# Patient Record
Sex: MALE | Race: WHITE | URBAN - METROPOLITAN AREA
[De-identification: names, ages, dates, MRNs, and addresses within clinical notes are randomized per-mention and may not be internally consistent; named-entity substitution may affect disease eponyms.]

---

## 2022-11-04 ENCOUNTER — PROCEDURE VISIT (OUTPATIENT)
Dept: SPORTS MEDICINE | Age: 22
End: 2022-11-04

## 2022-11-04 DIAGNOSIS — M75.22 BICEPS TENDINITIS OF LEFT SHOULDER: Primary | ICD-10-CM

## 2022-11-04 NOTE — PROGRESS NOTES
Athletic Training  Date of Report: 2022  Name: Jean-Claude Streeter  School: [employer]  Sport: Swimming/Diving  : 2000  Age: 24 y.o. MRN: <[id number]>  Encounter:  [x] New AT Eval     [] Follow-Up Visit    [] Other:   SUBJECTIVE:  Reason for Visit:    Chief Complaint   Patient presents with    Shoulder Injury     Jean-Claude Streeter is a 24y.o. year old, male who presents today for evaluation of athletic injury involving left shoulder. 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 Jean-Claude Streeter report they are right hand dominate. Onset of the injury began gradually, starting about 2 week ago and injury occurred during practice. Current pain and symptoms include: sharp when swimming after a certain durration and achy/sore at rest following practice. Current level of pain is a 2 at rest and a 5/10 while swimming. Symptoms have been chronic since that time. Symptoms improve with ice. No noticeable pain relief with medication. Symptoms worsen with swimming. The shoulder has not dislocated or felt out of place. Shoulder has felt numb and/or lost sensation on one occasion a few days ago, but symptoms have resolved. Associated sounds or feelings at time of injury included: snap. Treatment to date has included: none. Treatment has been somewhat helpful. Previous history includes: None.    OBJECTIVE:   Physical Exam  Shoulder Positioning / Carry Position:    Shoulder Position: [x] Normal  [] Guarding   [] Hanging Limp  Assistive Device: [x] None  [] Brace  [] Sling  [] Other:   Inspection:   Skin:   [x] Intact [] Abrasion  [] Laceration  Notes:   Ecchymosis:  [x] None [] Mild  [] Moderate  [] Severe  Notes:   Atrophy:  [x] None [] Mild  [] Moderate  [] Severe  Notes:   Effusion:  [x] None [] Mild  [] Moderate  [] Severe  Notes:   Deformity:  [x] None [] Mild  [] Moderate  [] Severe  Notes:   Scar / Surgical incision(s): [] A-Scope Portals  [] Open Surgical Incision(s)  Notes:   Joint Hypertrophy:  Notes:   Alignment:   [x] Alignment was not assessed   Normal Measured Findings/Notes Passively Correctable to Normal   Head Positioning []  []   Scapular Winging []  []   Vert Scap Position []  []   Fredrica Ding Position []  []   Scapular Rotation []  []   Shoulder Elevation []  []    []  []    []  []   Orthopaedic Exam: Left Shoulder  Palpation:   Tenderness: [] None  [x] Mild [] Moderate [] Severe   at: Acromion Process and Bicipital Groove  Crepitation: [x] None  [] Mild [] Moderate [] Severe   at:   Effusion: [x] None  [] Mild [] Moderate [] Severe   at:   Brachial Pulse:  [] Not assessed [] Not Detected [x] Detected  Radial Pulse:  [] Not assessed [] Not Detected [x] Detected  Deformity: None  Range of Motion: (Not assessed if not marked)  [] Normal Flexibility / Mobility   ROM WNL PROM AROM OP Comments     L R L R L R    Flexion  []     P! Extension [x]          Abduction []   P! P! Adduction [x]          Horizontal Adduction [x]          Horizontal Abduction [x]          ER [x]          IR []   P! P!     90/90 ER [x]          90/90 IR []   P! []           []          Manual Muscle Test: (Not assessed if not marked)  [x] Normal Strength  MMT Left Right Comment   GH Flexion      GH extension      GH Abduction      GH IR      GH ER      90/90 GH IR      90/90 GH ER      Scapular Retraction      Scapular Protraction      Scapular Elevation      Scapular Depression                  Provocative Tests: (Not tested if not marked)   Negative Positive Positive Findings   Labral Pathology      Load Shift [] []    Jerk Test [x] []    Grind [x] []    Clunk [x] []    Crank [] []    [place] Test [] [x]    SC / AC joint      Crossover ADD [x] []    Piano Key [x] []    RTC       Empty Can [] [x]    Drop Arm [x] []    Painful Arc [x] []    Biceps Pathology      Speed's [] [x]    Yergason's  [] [x]    Stability      Ant.  Apprehension [x] []    Luciana Relocation [x] []    Surprise Release  [x] [] Reflex / Motor Function:    Gross motor weakness of shoulder:  [x] None [] Mild  [] Moderate [] Severe  Notes:   Gross motor weakness of elbow:  [x] None [] Mild  [] Moderate [] Severe  Notes:   Gross motor weakness of wrist:  [x] None [] Mild  [] Moderate [] Severe  Notes:   Gross motor weakness of hand:  [x] None [] Mild  [] Moderate [] Severe  Notes:    Sensory / Neurologic Function:  [x] Sensation to light touch intact    [] Impaired:   [x] Deep tendon reflexes intact    [] Impaired:   [x] Coordination / proprioception intact  [] Impaired:   Contralateral Shoulder:  [x] Normal ROM and function with no pain. ASSESSMENT:   Diagnosis Orders   1. Biceps tendinitis of left shoulder          Clinical Impression: Tendonitis: Biceps & RTC  Status: As Tolerated  Est. Time Missed: None  PLAN:  Treatment:  [] Rest  [x] Ice   [] Wrap  [] Elevate  [] Tape  [] First Aid/Wound [] Moist Heat  [] Crutches  [] Brace  [] Splint  [] Sling  [] Immobilizer   [] Whirlpool  [] Massage  [] Pneumatic  [x] Rehab/Exercise  [] Other:   Guardian Contacted: No  Comments / Instructions: Limit volume of backstroke. Follow-Up Care / Instructions:   Rosas Gonzales will follow up on Monday to start shoulder therapeutic exercises. Focus on RTC and scapular strengthening.   Electronically Signed By: Leroy Sorto, ATC, JENNY, ATC

## 2022-11-07 ENCOUNTER — PROCEDURE VISIT (OUTPATIENT)
Dept: SPORTS MEDICINE | Age: 22
End: 2022-11-07

## 2022-11-07 DIAGNOSIS — M75.22 BICEPS TENDINITIS OF LEFT SHOULDER: Primary | ICD-10-CM

## 2022-11-09 ENCOUNTER — PROCEDURE VISIT (OUTPATIENT)
Dept: SPORTS MEDICINE | Age: 22
End: 2022-11-09

## 2022-11-09 DIAGNOSIS — M25.512 CHRONIC PAIN OF BOTH SHOULDERS: Primary | ICD-10-CM

## 2022-11-09 DIAGNOSIS — G89.29 CHRONIC PAIN OF BOTH SHOULDERS: Primary | ICD-10-CM

## 2022-11-09 DIAGNOSIS — M25.511 CHRONIC PAIN OF BOTH SHOULDERS: Primary | ICD-10-CM

## 2022-11-09 NOTE — PROGRESS NOTES
Subjective:      Patient ID: Brandin Bedolla is a 24 y.o. male. Ora Nazario comes in today to continue therapeutic exercises for his bilateral shoulder pain. He reports his pain remains about the same. He only has pain when swimming. 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 He typically swims freestyle and backstroke events, but is unsure of what he signed up to swim this weekend. Objective:   Physical Exam    Assessment:       Diagnosis Orders   1. Chronic pain of both shoulders               Plan: Today Ora Nazario did the following exercises:    FR Chest Opener  Manual Shoulder Stretch   Banded GH Scaption 2x10  Banded D2 pattern 2x10  Prone Swimmers 2x12 each  Banded Clock Taps 2x8 each  BB Flex @ 90 degrees [id number]  BB Abd @ 90 degrees [id number]  Banded Deadbugs 2x12 each  Prone I,Y,T 1.5# 2x10 each direction  Premod 12'    Ora Nazario did all exercises today bilaterally. He was able to complete all exercises without issue, but they were challenging. Follow up in [address] [place] [address] to continue exercises on Monday.             Maria Thompson, [address] [place] [address]

## 2022-11-18 ENCOUNTER — PROCEDURE VISIT (OUTPATIENT)
Dept: SPORTS MEDICINE | Age: 22
End: 2022-11-18

## 2022-11-18 DIAGNOSIS — M25.511 CHRONIC PAIN OF BOTH SHOULDERS: Primary | ICD-10-CM

## 2022-11-18 DIAGNOSIS — M25.512 CHRONIC PAIN OF BOTH SHOULDERS: Primary | ICD-10-CM

## 2022-11-18 DIAGNOSIS — G89.29 CHRONIC PAIN OF BOTH SHOULDERS: Primary | ICD-10-CM

## 2022-11-21 ENCOUNTER — PROCEDURE VISIT (OUTPATIENT)
Dept: SPORTS MEDICINE | Age: 22
End: 2022-11-21

## 2022-11-21 DIAGNOSIS — M25.512 CHRONIC PAIN OF BOTH SHOULDERS: Primary | ICD-10-CM

## 2022-11-21 DIAGNOSIS — G89.29 CHRONIC PAIN OF BOTH SHOULDERS: Primary | ICD-10-CM

## 2022-11-21 DIAGNOSIS — M25.511 CHRONIC PAIN OF BOTH SHOULDERS: Primary | ICD-10-CM

## 2022-11-21 NOTE — PROGRESS NOTES
Subjective:      Patient ID: Juan Carlos Shaikh is a 24 y.o. male. Anderson Jacome comes in today for therapeutic exercises for his bilateral shoulder pain. He reports that he is now having consistent anterior pain in both of his shoulders. Objective:   Physical Exam    Assessment:       Diagnosis Orders   1. Chronic pain of both shoulders               Plan: Today Anderson Jacome did the following exercises:  Foam roller chest open stretch 5'  Doorway shoulder stretch  Scap punches 30 ea side  Shoulder IR @ 0 x30 each  Scap [id number]-UX-AX press x30 each  D1 Shoulder pattern 20 ea side  D2 Shoulder pattern 20 ea side  Wall alphabets with red ball x2 each side  BB flex @ 90 [id number] each  BOSU Walk-Overs 3x6  Exercise ball dead bugs 3x12    Anderson Jacome did well with all exercises today. Will continue rehab on Monday. He is going to grab his theraband from home and bring that back so he can do home exercises on the days that he is not seeing us. Continue to begin incorporating more core exercises as well to aid in his shoulder stability.            Davi Ledezma, ATC